# Patient Record
Sex: FEMALE | Race: WHITE | Employment: UNEMPLOYED | URBAN - METROPOLITAN AREA
[De-identification: names, ages, dates, MRNs, and addresses within clinical notes are randomized per-mention and may not be internally consistent; named-entity substitution may affect disease eponyms.]

---

## 2020-07-20 ENCOUNTER — HOSPITAL ENCOUNTER (EMERGENCY)
Age: 50
Discharge: HOME | End: 2020-07-21
Attending: EMERGENCY MEDICINE
Payer: COMMERCIAL

## 2020-07-20 ENCOUNTER — APPOINTMENT (OUTPATIENT)
Dept: GENERAL RADIOLOGY | Age: 50
End: 2020-07-20
Payer: COMMERCIAL

## 2020-07-20 ENCOUNTER — APPOINTMENT (OUTPATIENT)
Dept: CT IMAGING | Age: 50
End: 2020-07-20
Payer: COMMERCIAL

## 2020-07-20 VITALS
HEART RATE: 79 BPM | HEIGHT: 68 IN | TEMPERATURE: 98.7 F | DIASTOLIC BLOOD PRESSURE: 75 MMHG | OXYGEN SATURATION: 97 % | SYSTOLIC BLOOD PRESSURE: 115 MMHG | RESPIRATION RATE: 22 BRPM | BODY MASS INDEX: 31.07 KG/M2 | WEIGHT: 205 LBS

## 2020-07-20 LAB
A/G RATIO: 1.7 (ref 1.1–2.2)
ALBUMIN SERPL-MCNC: 4.3 G/DL (ref 3.4–5)
ALP BLD-CCNC: 83 U/L (ref 40–129)
ALT SERPL-CCNC: 12 U/L (ref 10–40)
ANION GAP SERPL CALCULATED.3IONS-SCNC: 9 MMOL/L (ref 3–16)
AST SERPL-CCNC: 14 U/L (ref 15–37)
BASOPHILS ABSOLUTE: 0 K/UL (ref 0–0.2)
BASOPHILS RELATIVE PERCENT: 0.2 %
BILIRUB SERPL-MCNC: <0.2 MG/DL (ref 0–1)
BILIRUBIN URINE: NEGATIVE
BLOOD, URINE: NEGATIVE
BUN BLDV-MCNC: 14 MG/DL (ref 7–20)
CALCIUM SERPL-MCNC: 9.6 MG/DL (ref 8.3–10.6)
CHLORIDE BLD-SCNC: 107 MMOL/L (ref 99–110)
CLARITY: CLEAR
CO2: 24 MMOL/L (ref 21–32)
COLOR: YELLOW
CREAT SERPL-MCNC: 0.7 MG/DL (ref 0.6–1.1)
D DIMER: 2358 NG/ML DDU (ref 0–229)
EOSINOPHILS ABSOLUTE: 0.2 K/UL (ref 0–0.6)
EOSINOPHILS RELATIVE PERCENT: 2.8 %
GFR AFRICAN AMERICAN: >60
GFR NON-AFRICAN AMERICAN: >60
GLOBULIN: 2.6 G/DL
GLUCOSE BLD-MCNC: 155 MG/DL (ref 70–99)
GLUCOSE URINE: NEGATIVE MG/DL
HCT VFR BLD CALC: 39.3 % (ref 36–48)
HEMOGLOBIN: 13.7 G/DL (ref 12–16)
KETONES, URINE: NEGATIVE MG/DL
LEUKOCYTE ESTERASE, URINE: NEGATIVE
LYMPHOCYTES ABSOLUTE: 2.5 K/UL (ref 1–5.1)
LYMPHOCYTES RELATIVE PERCENT: 30.1 %
MCH RBC QN AUTO: 32 PG (ref 26–34)
MCHC RBC AUTO-ENTMCNC: 34.9 G/DL (ref 31–36)
MCV RBC AUTO: 91.8 FL (ref 80–100)
MICROSCOPIC EXAMINATION: NORMAL
MONOCYTES ABSOLUTE: 0.6 K/UL (ref 0–1.3)
MONOCYTES RELATIVE PERCENT: 7.1 %
NEUTROPHILS ABSOLUTE: 5 K/UL (ref 1.7–7.7)
NEUTROPHILS RELATIVE PERCENT: 59.8 %
NITRITE, URINE: NEGATIVE
PDW BLD-RTO: 13.4 % (ref 12.4–15.4)
PH UA: 7 (ref 5–8)
PLATELET # BLD: 316 K/UL (ref 135–450)
PMV BLD AUTO: 7.7 FL (ref 5–10.5)
POTASSIUM REFLEX MAGNESIUM: 3.9 MMOL/L (ref 3.5–5.1)
PRO-BNP: 28 PG/ML (ref 0–124)
PROTEIN UA: NEGATIVE MG/DL
RBC # BLD: 4.28 M/UL (ref 4–5.2)
SODIUM BLD-SCNC: 140 MMOL/L (ref 136–145)
SPECIFIC GRAVITY UA: 1.01 (ref 1–1.03)
TOTAL PROTEIN: 6.9 G/DL (ref 6.4–8.2)
TROPONIN: <0.01 NG/ML
URINE REFLEX TO CULTURE: NORMAL
URINE TYPE: NORMAL
UROBILINOGEN, URINE: 0.2 E.U./DL
WBC # BLD: 8.4 K/UL (ref 4–11)

## 2020-07-20 PROCEDURE — 85025 COMPLETE CBC W/AUTO DIFF WBC: CPT

## 2020-07-20 PROCEDURE — 71045 X-RAY EXAM CHEST 1 VIEW: CPT

## 2020-07-20 PROCEDURE — 84484 ASSAY OF TROPONIN QUANT: CPT

## 2020-07-20 PROCEDURE — 99285 EMERGENCY DEPT VISIT HI MDM: CPT

## 2020-07-20 PROCEDURE — 83880 ASSAY OF NATRIURETIC PEPTIDE: CPT

## 2020-07-20 PROCEDURE — 93005 ELECTROCARDIOGRAM TRACING: CPT | Performed by: EMERGENCY MEDICINE

## 2020-07-20 PROCEDURE — 80053 COMPREHEN METABOLIC PANEL: CPT

## 2020-07-20 PROCEDURE — 2580000003 HC RX 258: Performed by: NURSE PRACTITIONER

## 2020-07-20 PROCEDURE — 6370000000 HC RX 637 (ALT 250 FOR IP): Performed by: NURSE PRACTITIONER

## 2020-07-20 PROCEDURE — 6360000004 HC RX CONTRAST MEDICATION: Performed by: NURSE PRACTITIONER

## 2020-07-20 PROCEDURE — 81003 URINALYSIS AUTO W/O SCOPE: CPT

## 2020-07-20 PROCEDURE — 36415 COLL VENOUS BLD VENIPUNCTURE: CPT

## 2020-07-20 PROCEDURE — 71260 CT THORAX DX C+: CPT

## 2020-07-20 PROCEDURE — 85379 FIBRIN DEGRADATION QUANT: CPT

## 2020-07-20 RX ORDER — NAPROXEN 500 MG/1
500 TABLET ORAL 2 TIMES DAILY PRN
Qty: 60 TABLET | Refills: 0 | Status: SHIPPED | OUTPATIENT
Start: 2020-07-20

## 2020-07-20 RX ORDER — 0.9 % SODIUM CHLORIDE 0.9 %
1000 INTRAVENOUS SOLUTION INTRAVENOUS ONCE
Status: COMPLETED | OUTPATIENT
Start: 2020-07-20 | End: 2020-07-20

## 2020-07-20 RX ORDER — ASPIRIN 81 MG/1
324 TABLET, CHEWABLE ORAL ONCE
Status: COMPLETED | OUTPATIENT
Start: 2020-07-20 | End: 2020-07-20

## 2020-07-20 RX ADMIN — ASPIRIN 324 MG: 81 TABLET, CHEWABLE ORAL at 22:36

## 2020-07-20 RX ADMIN — IOPAMIDOL 75 ML: 755 INJECTION, SOLUTION INTRAVENOUS at 22:23

## 2020-07-20 RX ADMIN — SODIUM CHLORIDE 1000 ML: 9 INJECTION, SOLUTION INTRAVENOUS at 22:36

## 2020-07-20 ASSESSMENT — HEART SCORE: ECG: 0

## 2020-07-21 LAB
EKG ATRIAL RATE: 105 BPM
EKG DIAGNOSIS: NORMAL
EKG P AXIS: 17 DEGREES
EKG P-R INTERVAL: 146 MS
EKG Q-T INTERVAL: 326 MS
EKG QRS DURATION: 80 MS
EKG QTC CALCULATION (BAZETT): 430 MS
EKG R AXIS: 15 DEGREES
EKG T AXIS: 60 DEGREES
EKG VENTRICULAR RATE: 105 BPM

## 2020-07-21 PROCEDURE — 93010 ELECTROCARDIOGRAM REPORT: CPT | Performed by: INTERNAL MEDICINE

## 2020-07-21 ASSESSMENT — PAIN SCALES - GENERAL
PAINLEVEL_OUTOF10: 0
PAINLEVEL_OUTOF10: 0

## 2020-07-21 NOTE — ED PROVIDER NOTES
I independently performed a history and physical on Moira Garrison. All diagnostic, treatment, and disposition decisions were made by myself in conjunction with the advanced practice provider. Briefly, this is a 44 y.o. female here for numbness of her left upper and lower extremity. This started last night while she was asleep and worsened through the day. Initially started in the left upper extremity and then progressed to the left lower extremity. She has a history of anxiety and as the paresthesias worsen she then became anxious and she started feeling her heart fluttering like an apple watch tapping. On exam, nontoxic-appearing adult female in no acute distress. Heart and lung sounds are normal.  No chest wall tenderness. No spinal deformity or tenderness. Normal strength and sensation bilateral upper and lower extremities. No facial asymmetry, no dysarthria. Patient had tenderness in the left axillary region. Do not appreciate any adenopathy. Breast exam revealed some nodularity on the left which is different from the right. EKG  The Ekg interpreted by me in the absence of a cardiologist shows:  Sinus tachycardia rate of 105 beats minute, MS interval QRS QTC now. Normal axis. No acute ischemic changes. No previous for comparison. Screenings     Heart Score for chest pain patients  History: Slightly Suspicious  ECG: Normal  Patient Age: > 39 and < 65 years  *Risk factors for Atherosclerotic disease: Obesity, Positive family History  Risk Factors: 1 or 2 risk factors  Troponin: < 1X normal limit  Heart Score Total: 2      Critical Time  None       MDM  Adult female with paresthesias and shortness and chest pain. She believes that these were due to anxiety however given the patient's age we do find it appropriate to evaluate further. Laboratory studies EKG chest x-ray ordered for diagnostic work-up in the emergency room is unremarkable.   The patient is reassessed she has improvement of her symptoms. Vital signs are improved. I have low suspicion for serious or life-threatening conditions including ACS, pulmonary bruising, CVA, aortic dissection, carotid dissection. Patient will be discharged home. Follow-up in the primary care setting is recommended. An ultrasound of the left breast is ordered because of increased density. We contacted the patient to give her instructions on how to schedule her ultrasound. Patient Referrals:  [facility]  [phone]  Schedule an appointment as soon as possible for a visit in 2 days      Diane Montelongo MD  [facility] [address]  [phone]    Schedule an appointment as soon as possible for a visit in 2 days      [facility]  ED  [address]  Go to   As needed, If symptoms worsen      Discharge Medications:  Discharge Medication List as of 7/20/2020 11:52 PM      START taking these medications    Details   naproxen (NAPROSYN) 500 MG tablet Take 1 tablet by mouth 2 times daily as needed for Pain, Disp-60 tablet,R-0Print             FINAL IMPRESSION  1. Chest pain, unspecified type    2. Anxiety state    3. Paresthesia of left upper and lower extremity    4. Breast nodule        Blood pressure 115/75, pulse 79, temperature 98.7 °F (37.1 °C), temperature source Oral, resp. rate 22, height 5' 8\" (1.727 m), weight 205 lb (93 kg), SpO2 97 %. For further details of [facility] emergency department encounter, please see documentation by advanced practice provider.       Raul Kirby MD  07/21/20 0975       Raul Kirby MD  07/21/20 0040

## 2020-07-21 NOTE — ED NOTES
Discharge instructions reviewed with patient. Verbal understanding given. Pt ambulated out of ED in stable condition.       Ary Bo RN  07/21/20 9232

## 2020-07-21 NOTE — ED PROVIDER NOTES
[address] [place] [address]  ED  [address] [address]  Dept: [phone]  Dept Fax: [phone]  Loc: [facility]        This patient was seen and evaluated per myself in conjunction with ED attending Dr. Rosa Maria Chua. CHIEF COMPLAINT    Chief Complaint   Patient presents with    Shortness of Breath     Patient ambulates into ED with c/o shortness of breath and chest pain that has been going on for a few days. HPI    Alvarez Galaviz is a 44 y.o. female who presents with shortness of breath and chest pain. Onset was greater than 24 hours ago. She states that it is intermittent in nature. She states that the pain is a pressure-like sensation. She states that she was experiencing some left arm pain which \"made me anxious\" and then she experienced the chest pressure and shortness of breath. She states that she has a longstanding history of anxiety. Patient states that she came to the ED for further evaluation and treatment. REVIEW OF SYSTEMS    Cardiac: + chest pain, + int palpitations, no syncope  Respiratory: see HPI, no cough  Neurologic: no syncope or LOC  GI: no vomiting, no diarrhea  General: no fever  All other systems reviewed and are negative.     PAST MEDICAL & SURGICAL HISTORY    Past Medical History:   Diagnosis Date    Arthritis     Depression     Hyperlipidemia      Past Surgical History:   Procedure Laterality Date    ANKLE FUSION Right 2014    BUNIONECTOMY Right      SECTION      EYE SURGERY  6/24/10    orbital decompression    HYSTEROSCOPY  5/21/15    dx hysteroscopy, myosure, novasure ablation    KNEE SURGERY Left        CURRENT MEDICATIONS  (may include discharge medications prescribed in the ED)      ALLERGIES    Allergies   Allergen Reactions    Erythromycin Nausea And Vomiting       SOCIAL & FAMILY HISTORY    Social History     Socioeconomic History    Marital status:  Spouse name: None    Number of children: None    Years of education: None    Highest education level: None   Occupational History    None   Social Needs    Financial resource strain: None    Food insecurity     Worry: None     Inability: None    Transportation needs     Medical: None     Non-medical: None   Tobacco Use    Smoking status: Never Smoker    Smokeless tobacco: Never Used   Substance and Sexual Activity    Alcohol use: No    Drug use: Never    Sexual activity: None   Lifestyle    Physical activity     Days per week: None     Minutes per session: None    Stress: None   Relationships    Social connections     Talks on phone: None     Gets together: None     Attends Scientologist service: None     Active member of club or organization: None     Attends meetings of clubs or organizations: None     Relationship status: None    Intimate partner violence     Fear of current or ex partner: None     Emotionally abused: None     Physically abused: None     Forced sexual activity: None   Other Topics Concern    None   Social History Narrative    None     History reviewed. No pertinent family history.     PHYSICAL EXAM    VITAL SIGNS: /75   Pulse 79   Temp 98.7 °F (37.1 °C) (Oral)   Resp 22   Ht 5' 8\" (1.727 m)   Wt 205 lb (93 kg)   LMP  (LMP Unknown)   SpO2 97%   BMI 31.17 kg/m²    Constitutional:  Well developed, well nourished, no acute distress   HENT:  Atraumatic, moist mucus membranes  Neck: supple, no JVD   Respiratory:  Lungs clear to auscultation throughout all lung fields, no wheezes, no retractions   Cardiovascular: Slightly tachycardic rate, no murmurs, rubs or gallops  Vascular: Radial and DP pulses 2+ and equal bilaterally  GI:  Soft, nontender, normal bowel sounds  Musculoskeletal:  no lower extremity edema, no lower extremity asymmetry, no calf tenderness, no thigh tenderness, no acute deformities  Integument:  Skin is warm and dry, no petechiae   Neurologic:  Alert & oriented, no slurred speech  Psych: Pleasant affect, no hallucinations    EKG    Please see the physician note for EKG interpretation. RADIOLOGY/PROCEDURES    CT Chest Pulmonary Embolism W Contrast   Preliminary Result   1. No CT evidence of a pulmonary embolism. 2. No acute intrapulmonary findings. XR CHEST PORTABLE   Final Result   Unremarkable portable chest radiograph. ED COURSE & MEDICAL DECISION MAKING    Pertinent Labs & Imaging studies reviewed and interpreted. (See chart for details)    See chart for details of medications given during the ED stay. Vitals:    07/20/20 2058 07/20/20 2257 07/20/20 2328   BP: 132/81 121/77 115/75   Pulse: 108 92 79   Resp: 20 22   Temp: 98.7 °F (37.1 °C)     TempSrc: Oral     SpO2: 97% 97% 97%   Weight: 205 lb (93 kg)     Height: 5' 8\" (1.727 m)         Differential Diagnosis: Acute Coronary Syndrome, Congestive Heart Failure, Myocardial Infarction, Pulmonary Embolus, Pneumonia, Pneumothorax, other    Patient is afebrile and nontoxic in appearance. Labs reveal no leukocytosis or anemia. Metabolic panel unremarkable. Urinalysis unremarkable. CXR findings as above. EKG interpreted by physician. Troponin negative. Given her age I cannot apply the PERC criteria. However I did obtain a d-dimer, this was grossly elevated at 2358. Therefore CT of the chest to rule out PE was ordered    CT of the chest to rule out PE as read by the radiologist as above. Heart score is 2. Reevaluation: patient is resting comfortably. She has not had any symptoms during her ED course. Most likely her symptoms were driven by her anxiety. However I will refer to cardiologist for further follow-up. I will discharge her home on an NSAID. She needs to follow-up with her primary care provider as well, one is not on file and therefore I will give her no PCP referral.  She is in agreement with this plan as well as the plan of discharge.   She has no further 7.0 5.0 - 8.0    Protein, UA Negative Negative mg/dL    Urobilinogen, Urine 0.2 <2.0 E.U./dL    Nitrite, Urine Negative Negative    Leukocyte Esterase, Urine Negative Negative    Microscopic Examination Not Indicated     Urine Type NotGiven     Urine Reflex to Culture Not Indicated    D-Dimer, Quantitative   Result Value Ref Range    D-Dimer, Quant 2358 (H) 0 - 229 ng/mL DDU       I estimate there is LOW risk for PULMONARY EMBOLISM, ACUTE CORONARY SYNDROME, OR THORACIC AORTIC DISSECTION, thus I consider the discharge disposition reasonable. Sridhar Villar and I have discussed the diagnosis and risks, and we agree with discharging home to follow-up with their primary doctor. We also discussed returning to the Emergency Department immediately if new or worsening symptoms occur. We have discussed the symptoms which are most concerning (e.g., bloody sputum, fever, worsening pain or shortness of breath, vomiting) that necessitate immediate return. Blood pressure 115/75, pulse 79, temperature 98.7 °F (37.1 °C), temperature source Oral, resp. rate 22, height 5' 8\" (1.727 m), weight 205 lb (93 kg), SpO2 97 %. FINAL IMPRESSION    1. Chest pain, unspecified type    2.  Anxiety state        PLAN  Discharge with close outpatient follow up    (Please note that this note was completed with a voice recognition program.  Every attempt was made to edit the dictations, but inevitably there remain words that are mis-transcribed.)        Alejandro Gomez, CAROL - CNP  07/20/20 3234

## 2020-07-22 ENCOUNTER — CARE COORDINATION (OUTPATIENT)
Dept: CARE COORDINATION | Age: 50
End: 2020-07-22

## 2020-07-22 NOTE — CARE COORDINATION
Patient contacted regarding recent visit for viral symptoms. This Clarita Nobles contacted the patient by telephone to perform post discharge call. Left message on machine for a return call regarding ED visit 7/20. If no return call, will attempt to reach pt again.     Austyn Polo  [phone]

## 2020-07-23 NOTE — CARE COORDINATION
Patient contacted regarding recent visit for viral symptoms. This Jan Butler contacted the patient by telephone to perform post discharge call. Left another message on machine for a return call regarding ED visit 7/20. Will wait for a return call.      Devora Dunn  [phone]